# Patient Record
Sex: MALE | Race: WHITE | NOT HISPANIC OR LATINO | URBAN - METROPOLITAN AREA
[De-identification: names, ages, dates, MRNs, and addresses within clinical notes are randomized per-mention and may not be internally consistent; named-entity substitution may affect disease eponyms.]

---

## 2017-03-12 ENCOUNTER — EMERGENCY (EMERGENCY)
Facility: HOSPITAL | Age: 57
LOS: 0 days | Discharge: LEFT BEFORE DISCHARGE | End: 2017-03-13

## 2017-06-27 DIAGNOSIS — J02.9 ACUTE PHARYNGITIS, UNSPECIFIED: ICD-10-CM

## 2017-06-27 DIAGNOSIS — M54.2 CERVICALGIA: ICD-10-CM

## 2017-06-27 DIAGNOSIS — I10 ESSENTIAL (PRIMARY) HYPERTENSION: ICD-10-CM

## 2025-02-19 ENCOUNTER — OUTPATIENT (OUTPATIENT)
Dept: OUTPATIENT SERVICES | Facility: HOSPITAL | Age: 65
LOS: 1 days | End: 2025-02-19
Payer: COMMERCIAL

## 2025-02-19 VITALS
RESPIRATION RATE: 16 BRPM | DIASTOLIC BLOOD PRESSURE: 95 MMHG | OXYGEN SATURATION: 96 % | TEMPERATURE: 98 F | WEIGHT: 206.13 LBS | HEIGHT: 66 IN | HEART RATE: 58 BPM | SYSTOLIC BLOOD PRESSURE: 154 MMHG

## 2025-02-19 DIAGNOSIS — M17.0 BILATERAL PRIMARY OSTEOARTHRITIS OF KNEE: ICD-10-CM

## 2025-02-19 PROCEDURE — 73564 X-RAY EXAM KNEE 4 OR MORE: CPT | Mod: 26,50

## 2025-02-19 PROCEDURE — 73564 X-RAY EXAM KNEE 4 OR MORE: CPT | Mod: 50

## 2025-02-19 RX ORDER — ROSUVASTATIN CALCIUM 20 MG/1
1 TABLET, FILM COATED ORAL
Refills: 0 | DISCHARGE

## 2025-02-19 RX ORDER — AMLODIPINE BESYLATE 10 MG/1
1 TABLET ORAL
Refills: 0 | DISCHARGE

## 2025-02-19 RX ORDER — POVIDONE-IODINE 7.5 %
1 SOLUTION, NON-ORAL TOPICAL ONCE
Refills: 0 | Status: DISCONTINUED | OUTPATIENT
Start: 2025-02-20 | End: 2025-02-21

## 2025-02-19 RX ORDER — CLOPIDOGREL BISULFATE 75 MG/1
1 TABLET, FILM COATED ORAL
Refills: 0 | DISCHARGE

## 2025-02-19 NOTE — ASU PATIENT PROFILE, ADULT - TOBACCO USE
Never smoker Drysol Counseling:  I discussed with the patient the risks of drysol/aluminum chloride including but not limited to skin rash, itching, irritation, burning.

## 2025-02-19 NOTE — H&P ADULT - PROBLEM SELECTOR PLAN 1
Admit to Orthopaedic Service.  Presents today for elective Right TKR  Pt medically stable and cleared for procedure today by  ___ Admit to Orthopaedic Service.  Presents today for elective Right TKR  Pt medically stable and cleared for procedure today by Dr. Robert Admit to Orthopaedic Service.  Presents today for elective left TKR  Pt medically stable and cleared for procedure today by Dr. Robert

## 2025-02-19 NOTE — H&P ADULT - HISTORY OF PRESENT ILLNESS
64yo male with right knee pain x    Has the patient used any narcotic more than 90 days prior to the date of surgery? YES or NO    Presents today for elective Right total knee replacement with Dr. Weaver 64yo male with left knee pain x years without accident or injury to bring on pain. Pain persists despite conservative measures including PT and injections.   Pt takes tylenol at home for pain. Pt denies numbness, tingling, paresthesais to BLE. Ambulates with no assistance at baseline.   Has the patient used any narcotic more than 90 days prior to the date of surgery? No   Pt takes Plavix for hx of stroke in 2020, but has held Plavix since last dose Sunday evening  Presents today for elective left total knee replacement with Dr. Weaver

## 2025-02-19 NOTE — H&P ADULT - NSHPPHYSICALEXAM_GEN_ALL_CORE
Gen: Alert and oriented, NAD  MSK: Decreased ROM to right knee 2/2 pain     Remainder of PE per medical clearance note Gen: Alert and oriented, NAD  MSK: Decreased ROM to left knee 2/2 pain   MSK: No deformity or open wounds. No rashes or lesions. EHL/TA/GS/FHL 5/5 BLE. Sensation intact and equal BLE. Skin warm and well perfused. DP palpable BLE. Cap refill brisk. Calves soft, nontender BLE.   Remainder of PE per medical clearance note

## 2025-02-19 NOTE — PRE-OP CHECKLIST - 3.
Booking shet said RIGHT knee but the patient said LEFT knee, reached out with Dr Giraldo and as per office it should  be LEFT and they will change booking to correct

## 2025-02-19 NOTE — H&P ADULT - NSHPLABSRESULTS_GEN_ALL_CORE
Preop CBC, within normal limits- reviewed by medical clearance.  H/H: 16.7/49.5  Preop EKG: NSR, 91bpm, reviewed by medical clearance.   DOS

## 2025-02-20 ENCOUNTER — INPATIENT (INPATIENT)
Facility: HOSPITAL | Age: 65
LOS: 0 days | Discharge: HOME HEALTH | DRG: 470 | End: 2025-02-21
Attending: ORTHOPAEDIC SURGERY | Admitting: ORTHOPAEDIC SURGERY
Payer: COMMERCIAL

## 2025-02-20 DIAGNOSIS — M17.0 BILATERAL PRIMARY OSTEOARTHRITIS OF KNEE: ICD-10-CM

## 2025-02-20 DIAGNOSIS — M17.11 UNILATERAL PRIMARY OSTEOARTHRITIS, RIGHT KNEE: ICD-10-CM

## 2025-02-20 LAB
ANION GAP SERPL CALC-SCNC: 10 MMOL/L — SIGNIFICANT CHANGE UP (ref 5–17)
APTT BLD: 33.8 SEC — SIGNIFICANT CHANGE UP (ref 24.5–35.6)
BUN SERPL-MCNC: 16 MG/DL — SIGNIFICANT CHANGE UP (ref 7–23)
CALCIUM SERPL-MCNC: 9.3 MG/DL — SIGNIFICANT CHANGE UP (ref 8.4–10.5)
CHLORIDE SERPL-SCNC: 102 MMOL/L — SIGNIFICANT CHANGE UP (ref 96–108)
CO2 SERPL-SCNC: 25 MMOL/L — SIGNIFICANT CHANGE UP (ref 22–31)
CREAT SERPL-MCNC: 1.07 MG/DL — SIGNIFICANT CHANGE UP (ref 0.5–1.3)
EGFR: 78 ML/MIN/1.73M2 — SIGNIFICANT CHANGE UP
GLUCOSE SERPL-MCNC: 101 MG/DL — HIGH (ref 70–99)
INR BLD: 1.12 — SIGNIFICANT CHANGE UP (ref 0.85–1.16)
POTASSIUM SERPL-MCNC: 4.2 MMOL/L — SIGNIFICANT CHANGE UP (ref 3.5–5.3)
POTASSIUM SERPL-SCNC: 4.2 MMOL/L — SIGNIFICANT CHANGE UP (ref 3.5–5.3)
PROTHROM AB SERPL-ACNC: 13.1 SEC — SIGNIFICANT CHANGE UP (ref 9.9–13.4)
SODIUM SERPL-SCNC: 137 MMOL/L — SIGNIFICANT CHANGE UP (ref 135–145)

## 2025-02-20 PROCEDURE — 73560 X-RAY EXAM OF KNEE 1 OR 2: CPT | Mod: 26,LT

## 2025-02-20 DEVICE — CELLERATE SURGICAL POWDER RX 5GM: Type: IMPLANTABLE DEVICE | Site: RIGHT | Status: FUNCTIONAL

## 2025-02-20 DEVICE — ZIMMER FEMALE HEX SCREW MAGNETIC 2.5MM X 25MM: Type: IMPLANTABLE DEVICE | Site: RIGHT | Status: FUNCTIONAL

## 2025-02-20 DEVICE — ZIMMER/NEXGEN HEX HEAD SCREW 3.5MM: Type: IMPLANTABLE DEVICE | Site: RIGHT | Status: FUNCTIONAL

## 2025-02-20 DEVICE — IMPLANTABLE DEVICE: Type: IMPLANTABLE DEVICE | Site: RIGHT | Status: FUNCTIONAL

## 2025-02-20 DEVICE — ZIMMER/NEXGEN SMOOTH PIN 3.2X75MM: Type: IMPLANTABLE DEVICE | Site: RIGHT | Status: FUNCTIONAL

## 2025-02-20 DEVICE — SURF ART PERSONA LT 6-9EF 12MM: Type: IMPLANTABLE DEVICE | Site: RIGHT | Status: FUNCTIONAL

## 2025-02-20 RX ORDER — METOCLOPRAMIDE HCL 10 MG
5 TABLET ORAL ONCE
Refills: 0 | Status: COMPLETED | OUTPATIENT
Start: 2025-02-20 | End: 2025-02-20

## 2025-02-20 RX ORDER — HYDROXYUREA 500 MG/1
500 CAPSULE ORAL AT BEDTIME
Refills: 0 | Status: DISCONTINUED | OUTPATIENT
Start: 2025-02-20 | End: 2025-02-21

## 2025-02-20 RX ORDER — MAGNESIUM HYDROXIDE 400 MG/5ML
30 SUSPENSION ORAL DAILY
Refills: 0 | Status: DISCONTINUED | OUTPATIENT
Start: 2025-02-20 | End: 2025-02-21

## 2025-02-20 RX ORDER — METOCLOPRAMIDE HCL 10 MG
10 TABLET ORAL ONCE
Refills: 0 | Status: DISCONTINUED | OUTPATIENT
Start: 2025-02-20 | End: 2025-02-20

## 2025-02-20 RX ORDER — ONDANSETRON HCL/PF 4 MG/2 ML
4 VIAL (ML) INJECTION EVERY 6 HOURS
Refills: 0 | Status: DISCONTINUED | OUTPATIENT
Start: 2025-02-20 | End: 2025-02-21

## 2025-02-20 RX ORDER — BISACODYL 5 MG
10 TABLET, DELAYED RELEASE (ENTERIC COATED) ORAL ONCE
Refills: 0 | Status: DISCONTINUED | OUTPATIENT
Start: 2025-02-22 | End: 2025-02-21

## 2025-02-20 RX ORDER — METOPROLOL SUCCINATE 50 MG/1
1 TABLET, EXTENDED RELEASE ORAL
Refills: 0 | DISCHARGE

## 2025-02-20 RX ORDER — METOPROLOL SUCCINATE 50 MG/1
25 TABLET, EXTENDED RELEASE ORAL AT BEDTIME
Refills: 0 | Status: DISCONTINUED | OUTPATIENT
Start: 2025-02-20 | End: 2025-02-21

## 2025-02-20 RX ORDER — ASPIRIN 325 MG
81 TABLET ORAL
Refills: 0 | Status: DISCONTINUED | OUTPATIENT
Start: 2025-02-20 | End: 2025-02-21

## 2025-02-20 RX ORDER — HYDROMORPHONE/SOD CHLOR,ISO/PF 2 MG/10 ML
0.5 SYRINGE (ML) INJECTION
Refills: 0 | Status: COMPLETED | OUTPATIENT
Start: 2025-02-20 | End: 2025-02-27

## 2025-02-20 RX ORDER — ROSUVASTATIN CALCIUM 20 MG/1
1 TABLET, FILM COATED ORAL
Refills: 0 | DISCHARGE

## 2025-02-20 RX ORDER — CELECOXIB 50 MG/1
200 CAPSULE ORAL EVERY 12 HOURS
Refills: 0 | Status: DISCONTINUED | OUTPATIENT
Start: 2025-02-21 | End: 2025-02-21

## 2025-02-20 RX ORDER — OXYCODONE HYDROCHLORIDE 30 MG/1
10 TABLET ORAL EVERY 4 HOURS
Refills: 0 | Status: DISCONTINUED | OUTPATIENT
Start: 2025-02-20 | End: 2025-02-21

## 2025-02-20 RX ORDER — POLYETHYLENE GLYCOL 3350 17 G/17G
17 POWDER, FOR SOLUTION ORAL AT BEDTIME
Refills: 0 | Status: DISCONTINUED | OUTPATIENT
Start: 2025-02-20 | End: 2025-02-21

## 2025-02-20 RX ORDER — SENNA 187 MG
2 TABLET ORAL AT BEDTIME
Refills: 0 | Status: DISCONTINUED | OUTPATIENT
Start: 2025-02-20 | End: 2025-02-21

## 2025-02-20 RX ORDER — APREPITANT 40 MG/1
40 CAPSULE ORAL ONCE
Refills: 0 | Status: COMPLETED | OUTPATIENT
Start: 2025-02-20 | End: 2025-02-20

## 2025-02-20 RX ORDER — CEFAZOLIN SODIUM IN 0.9 % NACL 3 G/100 ML
2000 INTRAVENOUS SOLUTION, PIGGYBACK (ML) INTRAVENOUS EVERY 8 HOURS
Refills: 0 | Status: COMPLETED | OUTPATIENT
Start: 2025-02-20 | End: 2025-02-21

## 2025-02-20 RX ORDER — ROSUVASTATIN CALCIUM 20 MG/1
10 TABLET, FILM COATED ORAL AT BEDTIME
Refills: 0 | Status: DISCONTINUED | OUTPATIENT
Start: 2025-02-20 | End: 2025-02-21

## 2025-02-20 RX ORDER — MAGNESIUM, ALUMINUM HYDROXIDE 200-200 MG
30 TABLET,CHEWABLE ORAL
Refills: 0 | Status: DISCONTINUED | OUTPATIENT
Start: 2025-02-20 | End: 2025-02-21

## 2025-02-20 RX ORDER — AMLODIPINE BESYLATE 10 MG/1
5 TABLET ORAL DAILY
Refills: 0 | Status: DISCONTINUED | OUTPATIENT
Start: 2025-02-20 | End: 2025-02-21

## 2025-02-20 RX ORDER — ACETAMINOPHEN 500 MG/5ML
1000 LIQUID (ML) ORAL EVERY 8 HOURS
Refills: 0 | Status: DISCONTINUED | OUTPATIENT
Start: 2025-02-20 | End: 2025-02-21

## 2025-02-20 RX ORDER — KETOROLAC TROMETHAMINE 30 MG/ML
15 INJECTION, SOLUTION INTRAMUSCULAR; INTRAVENOUS EVERY 6 HOURS
Refills: 0 | Status: COMPLETED | OUTPATIENT
Start: 2025-02-20 | End: 2025-02-21

## 2025-02-20 RX ORDER — ACETAMINOPHEN 500 MG/5ML
1000 LIQUID (ML) ORAL ONCE
Refills: 0 | Status: COMPLETED | OUTPATIENT
Start: 2025-02-20 | End: 2025-02-20

## 2025-02-20 RX ORDER — HYDROMORPHONE/SOD CHLOR,ISO/PF 2 MG/10 ML
0.5 SYRINGE (ML) INJECTION
Refills: 0 | Status: DISCONTINUED | OUTPATIENT
Start: 2025-02-20 | End: 2025-02-21

## 2025-02-20 RX ORDER — OLMESARTAN MEDOXOMIL 5 MG/1
1 TABLET, FILM COATED ORAL
Refills: 0 | DISCHARGE

## 2025-02-20 RX ORDER — CELECOXIB 50 MG/1
200 CAPSULE ORAL ONCE
Refills: 0 | Status: COMPLETED | OUTPATIENT
Start: 2025-02-20 | End: 2025-02-20

## 2025-02-20 RX ORDER — HYDROXYUREA 500 MG/1
15 CAPSULE ORAL
Refills: 0 | DISCHARGE

## 2025-02-20 RX ORDER — SODIUM CHLORIDE 9 G/1000ML
1000 INJECTION, SOLUTION INTRAVENOUS
Refills: 0 | Status: DISCONTINUED | OUTPATIENT
Start: 2025-02-20 | End: 2025-02-21

## 2025-02-20 RX ORDER — OXYCODONE HYDROCHLORIDE 30 MG/1
5 TABLET ORAL EVERY 4 HOURS
Refills: 0 | Status: DISCONTINUED | OUTPATIENT
Start: 2025-02-20 | End: 2025-02-21

## 2025-02-20 RX ADMIN — Medication 0.5 MILLIGRAM(S): at 09:43

## 2025-02-20 RX ADMIN — Medication 0.5 MILLIGRAM(S): at 19:45

## 2025-02-20 RX ADMIN — CELECOXIB 200 MILLIGRAM(S): 50 CAPSULE ORAL at 13:54

## 2025-02-20 RX ADMIN — Medication 0.5 MILLIGRAM(S): at 19:19

## 2025-02-20 RX ADMIN — HYDROXYUREA 500 MILLIGRAM(S): 500 CAPSULE ORAL at 22:21

## 2025-02-20 RX ADMIN — Medication 4 MILLIGRAM(S): at 19:37

## 2025-02-20 RX ADMIN — Medication 5 MILLIGRAM(S): at 21:20

## 2025-02-20 RX ADMIN — ROSUVASTATIN CALCIUM 10 MILLIGRAM(S): 20 TABLET, FILM COATED ORAL at 22:21

## 2025-02-20 RX ADMIN — METOPROLOL SUCCINATE 25 MILLIGRAM(S): 50 TABLET, EXTENDED RELEASE ORAL at 22:21

## 2025-02-20 RX ADMIN — Medication 0.5 MILLIGRAM(S): at 19:03

## 2025-02-20 RX ADMIN — KETOROLAC TROMETHAMINE 15 MILLIGRAM(S): 30 INJECTION, SOLUTION INTRAMUSCULAR; INTRAVENOUS at 23:45

## 2025-02-20 RX ADMIN — APREPITANT 40 MILLIGRAM(S): 40 CAPSULE ORAL at 13:54

## 2025-02-20 RX ADMIN — KETOROLAC TROMETHAMINE 15 MILLIGRAM(S): 30 INJECTION, SOLUTION INTRAMUSCULAR; INTRAVENOUS at 23:44

## 2025-02-20 RX ADMIN — Medication 1000 MILLIGRAM(S): at 13:54

## 2025-02-20 NOTE — PRE-ANESTHESIA EVALUATION ADULT - NSANTHADDINFOFT_GEN_ALL_CORE
Medical evaluation reviewed.  NOT A CANDIDATE FOR SPINAL ANESTHESIA DUE TO LAST DOSE OF PLAVIX TAKEN LESS THAN 5 DAYS AGO.

## 2025-02-20 NOTE — PRE-ANESTHESIA EVALUATION ADULT - NSANTHPROCED_GEN_ALL_CORE
right adductor canal and ipack block/Regional Block left adductor canal and ipack block/Regional Block

## 2025-02-20 NOTE — PRE-ANESTHESIA EVALUATION ADULT - NSANTHOSAYNRD_GEN_A_CORE
No. SHINE screening performed.  STOP BANG Legend: 0-2 = LOW Risk; 3-4 = INTERMEDIATE Risk; 5-8 = HIGH Risk

## 2025-02-20 NOTE — BRIEF OPERATIVE NOTE - CONDITION POST OP
Problem: Pressure Injury, Risk for  Goal: # Skin remains intact  Outcome: Outcome Met, Continue evaluating goal progress toward completion     Problem: Stroke: Ischemic (Transient/Permanent)  Goal: Neurological status is maintained/restored to status at baseline  Description: Monitor neurological and mental status including symptoms of increasing intracranial pressure (headache, nausea/vomiting, or change in behavior). Hypertension (greater than 180 systolic) may also indicate a change in status related to stroke.  Outcome: Outcome Not Met, Continue to Monitor     Problem: At Risk for Falls  Goal: # Takes action to control fall-related risks  Outcome: Outcome Not Met, Continue to Monitor     Problem: Activity Intolerance  Goal: # Functional status is maintained or returned to baseline  Outcome: Outcome Not Met, Continue to Monitor     Problem: Impaired Physical Mobility  Goal: # Bed mobility, ambulation, and ADLs are maintained or returned to baseline during hospitalization  Outcome: Outcome Not Met, Continue to Monitor     Problem: VTE, Risk for  Goal: # Verbalizes understanding of VTE risk factors and prevention  Description: Document education using the patient education activity.   Outcome: Outcome Not Met, Continue to Monitor      Stable

## 2025-02-21 VITALS
HEART RATE: 60 BPM | RESPIRATION RATE: 17 BRPM | SYSTOLIC BLOOD PRESSURE: 115 MMHG | OXYGEN SATURATION: 94 % | DIASTOLIC BLOOD PRESSURE: 68 MMHG

## 2025-02-21 LAB
ANION GAP SERPL CALC-SCNC: 10 MMOL/L — SIGNIFICANT CHANGE UP (ref 5–17)
BUN SERPL-MCNC: 22 MG/DL — SIGNIFICANT CHANGE UP (ref 7–23)
CALCIUM SERPL-MCNC: 8.2 MG/DL — LOW (ref 8.4–10.5)
CHLORIDE SERPL-SCNC: 102 MMOL/L — SIGNIFICANT CHANGE UP (ref 96–108)
CO2 SERPL-SCNC: 24 MMOL/L — SIGNIFICANT CHANGE UP (ref 22–31)
CREAT SERPL-MCNC: 1.19 MG/DL — SIGNIFICANT CHANGE UP (ref 0.5–1.3)
EGFR: 69 ML/MIN/1.73M2 — SIGNIFICANT CHANGE UP
GLUCOSE SERPL-MCNC: 152 MG/DL — HIGH (ref 70–99)
HCT VFR BLD CALC: 43.8 % — SIGNIFICANT CHANGE UP (ref 39–50)
HGB BLD-MCNC: 14.8 G/DL — SIGNIFICANT CHANGE UP (ref 13–17)
MCHC RBC-ENTMCNC: 32 PG — SIGNIFICANT CHANGE UP (ref 27–34)
MCHC RBC-ENTMCNC: 33.8 G/DL — SIGNIFICANT CHANGE UP (ref 32–36)
MCV RBC AUTO: 94.6 FL — SIGNIFICANT CHANGE UP (ref 80–100)
NRBC BLD AUTO-RTO: 0 /100 WBCS — SIGNIFICANT CHANGE UP (ref 0–0)
PLATELET # BLD AUTO: 492 K/UL — HIGH (ref 150–400)
POTASSIUM SERPL-MCNC: 4.2 MMOL/L — SIGNIFICANT CHANGE UP (ref 3.5–5.3)
POTASSIUM SERPL-SCNC: 4.2 MMOL/L — SIGNIFICANT CHANGE UP (ref 3.5–5.3)
RBC # BLD: 4.63 M/UL — SIGNIFICANT CHANGE UP (ref 4.2–5.8)
RBC # FLD: 14.1 % — SIGNIFICANT CHANGE UP (ref 10.3–14.5)
SODIUM SERPL-SCNC: 136 MMOL/L — SIGNIFICANT CHANGE UP (ref 135–145)
WBC # BLD: 19.45 K/UL — HIGH (ref 3.8–10.5)
WBC # FLD AUTO: 19.45 K/UL — HIGH (ref 3.8–10.5)

## 2025-02-21 PROCEDURE — 73560 X-RAY EXAM OF KNEE 1 OR 2: CPT

## 2025-02-21 PROCEDURE — 97161 PT EVAL LOW COMPLEX 20 MIN: CPT

## 2025-02-21 PROCEDURE — 85610 PROTHROMBIN TIME: CPT

## 2025-02-21 PROCEDURE — 85730 THROMBOPLASTIN TIME PARTIAL: CPT

## 2025-02-21 PROCEDURE — 80048 BASIC METABOLIC PNL TOTAL CA: CPT

## 2025-02-21 PROCEDURE — C1889: CPT

## 2025-02-21 PROCEDURE — 85027 COMPLETE CBC AUTOMATED: CPT

## 2025-02-21 PROCEDURE — C1776: CPT

## 2025-02-21 PROCEDURE — C1713: CPT

## 2025-02-21 PROCEDURE — 36415 COLL VENOUS BLD VENIPUNCTURE: CPT

## 2025-02-21 RX ORDER — CELECOXIB 50 MG/1
1 CAPSULE ORAL
Qty: 14 | Refills: 0
Start: 2025-02-21 | End: 2025-02-27

## 2025-02-21 RX ORDER — AMLODIPINE BESYLATE 10 MG/1
1 TABLET ORAL
Refills: 0 | DISCHARGE

## 2025-02-21 RX ORDER — SENNA 187 MG
2 TABLET ORAL
Qty: 0 | Refills: 0 | DISCHARGE
Start: 2025-02-21

## 2025-02-21 RX ORDER — ASPIRIN 325 MG
1 TABLET ORAL
Qty: 60 | Refills: 0
Start: 2025-02-21 | End: 2025-03-22

## 2025-02-21 RX ORDER — AMLODIPINE BESYLATE 10 MG/1
1 TABLET ORAL
Qty: 0 | Refills: 0 | DISCHARGE
Start: 2025-02-21

## 2025-02-21 RX ORDER — HYDROMORPHONE/SOD CHLOR,ISO/PF 2 MG/10 ML
0.5 SYRINGE (ML) INJECTION EVERY 6 HOURS
Refills: 0 | Status: DISCONTINUED | OUTPATIENT
Start: 2025-02-21 | End: 2025-02-21

## 2025-02-21 RX ORDER — OXYCODONE HYDROCHLORIDE 30 MG/1
1 TABLET ORAL
Qty: 28 | Refills: 0
Start: 2025-02-21 | End: 2025-02-27

## 2025-02-21 RX ORDER — ACETAMINOPHEN 500 MG/5ML
2 LIQUID (ML) ORAL
Qty: 42 | Refills: 0
Start: 2025-02-21 | End: 2025-02-27

## 2025-02-21 RX ADMIN — Medication 40 MILLIGRAM(S): at 06:02

## 2025-02-21 RX ADMIN — KETOROLAC TROMETHAMINE 15 MILLIGRAM(S): 30 INJECTION, SOLUTION INTRAMUSCULAR; INTRAVENOUS at 07:28

## 2025-02-21 RX ADMIN — KETOROLAC TROMETHAMINE 15 MILLIGRAM(S): 30 INJECTION, SOLUTION INTRAMUSCULAR; INTRAVENOUS at 12:23

## 2025-02-21 RX ADMIN — Medication 0.5 MILLIGRAM(S): at 02:15

## 2025-02-21 RX ADMIN — Medication 0.5 MILLIGRAM(S): at 02:17

## 2025-02-21 RX ADMIN — KETOROLAC TROMETHAMINE 15 MILLIGRAM(S): 30 INJECTION, SOLUTION INTRAMUSCULAR; INTRAVENOUS at 12:35

## 2025-02-21 RX ADMIN — CELECOXIB 200 MILLIGRAM(S): 50 CAPSULE ORAL at 07:28

## 2025-02-21 RX ADMIN — Medication 100 MILLIGRAM(S): at 08:51

## 2025-02-21 RX ADMIN — Medication 81 MILLIGRAM(S): at 05:58

## 2025-02-21 RX ADMIN — KETOROLAC TROMETHAMINE 15 MILLIGRAM(S): 30 INJECTION, SOLUTION INTRAMUSCULAR; INTRAVENOUS at 05:58

## 2025-02-21 RX ADMIN — Medication 1000 MILLIGRAM(S): at 08:50

## 2025-02-21 RX ADMIN — Medication 100 MILLIGRAM(S): at 00:23

## 2025-02-21 RX ADMIN — CELECOXIB 200 MILLIGRAM(S): 50 CAPSULE ORAL at 05:58

## 2025-02-21 RX ADMIN — Medication 1000 MILLIGRAM(S): at 09:43

## 2025-02-21 NOTE — PROGRESS NOTE ADULT - SUBJECTIVE AND OBJECTIVE BOX
Ortho Post Op Check    Procedure: L TKA  Surgeon: Dr. Weaver    Pt comfortable without complaints, pain controlled  Denies CP, SOB, N/V, numbness/tingling     Vital Signs Last 24 Hrs  T(C): 36.6 (02-20-25 @ 18:39), Max: 36.6 (02-20-25 @ 18:39)  T(F): 97.8 (02-20-25 @ 18:39), Max: 97.8 (02-20-25 @ 18:39)  HR: 72 (02-20-25 @ 19:39) (66 - 86)  BP: 111/61 (02-20-25 @ 19:39) (111/61 - 133/75)  BP(mean): 81 (02-20-25 @ 19:39) (81 - 98)  RR: 17 (02-20-25 @ 19:39) (12 - 17)  SpO2: 96% (02-20-25 @ 19:39) (90% - 96%)  I&O's Summary      General: Pt Alert and oriented, NAD  DSG C/D/I - aquacel, Ace wrap, cryocuff  Pulses:2+ DP  Sensation: SILT  Motor: EHL/FHL/TA/GS 5/5 02-20    137  |  102  |  16  ----------------------------<  101[H]  4.2   |  25  |  1.07    Ca    9.3      20 Feb 2025 13:24        Post-op X-Ray: Hardware intact    A/P: 65yMale POD#0 s/p L TKA  - Stable  - Pain Control  - DVT ppx: ASA 81 bid  - Post op abx: Ancef  - PT, WBS: WBAT  Dispo: Pending PT eval    Ortho Pager [phone]
Ortho Note    Pt comfortable without complaints, pain controlled  Denies CP, SOB, N/V, numbness/tingling     Vital Signs Last 24 Hrs  T(C): --  T(F): --  HR: 82 (02-21-25 @ 04:00) (82 - 82)  BP: 105/55 (02-21-25 @ 04:00) (105/55 - 105/55)  BP(mean): 73 (02-21-25 @ 04:00) (73 - 73)  RR: 11 (02-21-25 @ 04:00) (11 - 11)  SpO2: 95% (02-21-25 @ 04:00) (95% - 95%)  I&O's Summary    20 Feb 2025 07:01  -  21 Feb 2025 07:00  --------------------------------------------------------  IN: 630 mL / OUT: 600 mL / NET: 30 mL        General: Pt Alert and oriented, NAD  DSG C/D/I  Pulses: 2+  Sensation: SILT  Motor: 5/5 EHL/FHL/TA/GS                          14.8   19.45 )-----------( 492      ( 21 Feb 2025 05:30 )             43.8     02-21    136  |  102  |  22  ----------------------------<  152[H]  4.2   |  24  |  1.19    Ca    8.2[L]      21 Feb 2025 05:30        A/P: 65yMale POD#1 s/p L TKA  - Stable  - Pain Control  - DVT ppx: SCDs, ASA  - PT, WBS: WBAT  - Pending PT eval today  - [facility]    Ortho Pager [phone]
Ortho Note    Subjective:  Pt comfortable without complaints, pain controlled with current pain medication regimen   Denies CP, SOB, N/V, numbness/tingling   Reivewed plan of care with patient at bedside    Vital Signs Last 24 Hrs  T(C): 36.6 (02-21-25 @ 08:00), Max: 36.6 (02-21-25 @ 08:00)  T(F): 97.8 (02-21-25 @ 08:00), Max: 97.8 (02-21-25 @ 08:00)  HR: 58 (02-21-25 @ 10:00) (56 - 75)  BP: 115/67 (02-21-25 @ 10:00) (97/56 - 115/67)  BP(mean): 82 (02-21-25 @ 10:00) (68 - 82)  RR: 20 (02-21-25 @ 10:00) (20 - 20)  SpO2: 94% (02-21-25 @ 10:00) (92% - 95%)  AVSS    Objective:    Physical Exam:  General: Pt Alert and oriented, NAD  Left Knee DSG C/D/I aquacel wrapped with curlex  Pulses: +2 pedal pulses, wwp toes  Sensation: silt intact bilateral LE  Motor: EHL/FHL/TA/GS- 5/5 bilateral LE        Plan of Care:  A/P: 65yMale POD#1 s/p Left TKA  - afebrile, wbcs 19.45- likely reactive due to surgery   - Pain Control- celebrex 200mg PO Q12h, Dilaudid 0.5mg Q6h prn breakthrough pain, tylenol 1000mg PO Q8h, toradol 15mg IV Q6h, x43 doses, Oxycodone 5-10mg Po Q4h prn moderate to severe pain   - DVT ppx: aspirin 81mg PO Daily, plavix 75mg Po daily   - PT, WBS: WBAT  - appreciate medicine recs  - bowel regimen, IS use, PPI   - cleared PT 2-  - Dispo-  dc to home today     Ortho Pager [phone]

## 2025-02-21 NOTE — ASU DISCHARGE PLAN (ADULT/PEDIATRIC) - ASU DC SPECIAL INSTRUCTIONSFT
ACTIVITY:   - Weight bear as tolerated with assistive device. No strenuous activity, heavy lifting, driving or returning to work until cleared by MD.   - Apply a cold compress to the surgical site several times daily to reduce pain and swelling. For icing, twenty-minute sessions followed by an hour off is recommended. You should ice as frequently as possible. Ice should NEVER be placed directly on the skin. Wearing compression stockings during the first week after surgery can help reduce swelling in your knee, calf and foot, but is not required.      (KNEE REPLACEMENTS)   DO place a pillow under your heel when elevating the leg, to encourage full extension of the knee. Do NOT place a pillow behind your knee for comfort, as this can lead to permanent difficulty straightening your leg. It is normal to develop some swelling in the leg, ankle, and foot because of gravity.       DRESSING/SHOWERING:   (AQUACEL – brown gel dressing)   - You may shower, your dressing is water-resistant. Do not soak in bathtubs. Remove dressing after postop day 7, then leave incision open to air. Keep your incision clean and dry. Do not pick at your incision. Do not apply creams, ointments or oils to your incision until cleared by your surgeon. Do not soak your incision in sitting water (ie tubs, pools, lakes, etc.) until cleared by your surgeon. Do not scrub the incision – instead, allow soap and water to flow over the incision and then pat it dry with a clean towel.         MEDICATION/ANTICOAGULATION:   -You have been prescribed aspirin, also continue your home plavix , as a preventative to help prevent postoperative blood clots. Please take this medication as prescribed.    - You have been prescribed medications for pain:     - Tylenol for mild to moderate pain. Do not exceed 3,000mg daily.     - For more severe pain, take Tylenol with the addition of narcotic pain medication. Take this medication as prescribed. This medication may cause drowsiness or dizziness. Do not operate machinery. This medication may cause constipation.   - For any additional medications, follow instructions on the bottle.    -Try to have regular bowel movements. Take stool softener or laxative if necessary. You may wish to take Miralax daily until you have regular bowel movements.    - If you have been prescribed Aspirin or an anti-inflammatory, please take prilosec (omeprazole) once a day, before breakfast, until no longer taking Aspirin or anti-inflammatory. This will help protect your stomach.   - If you have a pain management physician, please follow-up with them postoperatively.    - If you experience any negative side effects of your medications, please call your surgeon's office to discuss.      FOLLOW-UP:   - Call to schedule an appt with Dr. Precious Montemayor for follow up.   - Please follow-up with your primary care physician or any other specialist you see postoperatively, if needed.    - Contact your doctor or go to the emergency room if you experience: fever greater than 101.5, chills, chest pain, difficulty breathing, redness or excessive drainage around the incision, other concerns.  Your meds were sent to [facility] pharmacy  restart your home medications

## 2025-02-21 NOTE — ASU DISCHARGE PLAN (ADULT/PEDIATRIC) - CARE PROVIDER_API CALL
Porfirio Giraldo  Orthopaedic Surgery  [address]  [place] [address]  Phone: [phone]  Fax: [phone]  Follow Up Time: 2 weeks

## 2025-02-21 NOTE — ASU DISCHARGE PLAN (ADULT/PEDIATRIC) - PAIN MANAGEMENT
[facility] pharmacy/Prescription called to pharmacy/Prescriptions electronically submitted to pharmacy from [facility]

## 2025-02-21 NOTE — ASU DISCHARGE PLAN (ADULT/PEDIATRIC) - NS MD DC FALL RISK RISK
For information on Fall & Injury Prevention, visit: [email or web address] OR  [email or web address] OR  https://www.cdc.gov/steadi/patient.html

## 2025-02-21 NOTE — DISCHARGE NOTE NURSING/CASE MANAGEMENT/SOCIAL WORK - PATIENT PORTAL LINK FT
You can access the FollowMyHealth Patient Portal offered by [facility] by registering at the following website: [email or web address]. By joining [facility]’s FollowMyHealth portal, you will also be able to view your health information using other applications (apps) compatible with our system.

## 2025-02-21 NOTE — PHYSICAL THERAPY INITIAL EVALUATION ADULT - PERTINENT HX OF CURRENT PROBLEM, REHAB EVAL
64yo male with left knee pain x years without accident or injury to bring on pain. Pain persists despite conservative measures including PT and injections.   Pt takes tylenol at home for pain. Pt denies numbness, tingling, paresthesais to BLE. Ambulates with no assistance at baseline.   Has the patient used any narcotic more than 90 days prior to the date of surgery? No   Pt takes Plavix for hx of stroke in 2020, but has held Plavix since last dose Sunday evening  Presents today for elective left total knee replacement with Dr. Weaver

## 2025-02-21 NOTE — DISCHARGE NOTE NURSING/CASE MANAGEMENT/SOCIAL WORK - FINANCIAL ASSISTANCE
[facility] provides services at a reduced cost to those who are determined to be eligible through [facility]’s financial assistance program. Information regarding [facility]’s financial assistance program can be found by going to [email or web address] or by calling [phone].

## 2025-02-21 NOTE — ASU DISCHARGE PLAN (ADULT/PEDIATRIC) - FINANCIAL ASSISTANCE
[facility] provides services at a reduced cost to those who are determined to be eligible through [facility]’s financial assistance program. Information regarding [facility]’s financial assistance program can be found by going to [email or web address] or by calling [phone].

## 2025-02-26 DIAGNOSIS — Z86.73 PERSONAL HISTORY OF TRANSIENT ISCHEMIC ATTACK (TIA), AND CEREBRAL INFARCTION WITHOUT RESIDUAL DEFICITS: ICD-10-CM

## 2025-02-26 DIAGNOSIS — M17.12 UNILATERAL PRIMARY OSTEOARTHRITIS, LEFT KNEE: ICD-10-CM

## 2025-02-26 DIAGNOSIS — Z79.02 LONG TERM (CURRENT) USE OF ANTITHROMBOTICS/ANTIPLATELETS: ICD-10-CM

## 2025-02-26 DIAGNOSIS — I10 ESSENTIAL (PRIMARY) HYPERTENSION: ICD-10-CM

## 2025-02-26 DIAGNOSIS — E78.5 HYPERLIPIDEMIA, UNSPECIFIED: ICD-10-CM

## 2025-02-26 DIAGNOSIS — M25.561 PAIN IN RIGHT KNEE: ICD-10-CM

## (undated) DEVICE — POSITIONER FOAM EGG CRATE ULNAR 2PCS (PINK)

## (undated) DEVICE — SAW BLADE STRYKER SAGITTAL DUAL CUT TEETH 35X64X.64MM

## (undated) DEVICE — PACK TOTAL KNEE

## (undated) DEVICE — WARMING BLANKET UPPER ADULT

## (undated) DEVICE — DRSG AQUACEL 3.5 X 10"

## (undated) DEVICE — TOURNIQUET CUFF 34" DUAL PORT W PLC

## (undated) DEVICE — SAW BLADE STRYKER SAGITTAL 81.5X12.5X1.19MM

## (undated) DEVICE — PREP CHLORAPREP HI-LITE ORANGE 26ML

## (undated) DEVICE — VENODYNE/SCD SLEEVE CALF MEDIUM

## (undated) DEVICE — SAW BLADE STRYKER SAGITTAL 25X86.5X1.32MM

## (undated) DEVICE — SUT STRATAFIX SYMMETRIC PDS 1 45CM OS-6

## (undated) DEVICE — SUT STRATAFIX SPIRAL MONOCRYL PLUS 3-0 30CM PS-1 UNDYED

## (undated) DEVICE — GLV 8.5 PROTEXIS (WHITE)

## (undated) DEVICE — SUT VICRYL 2-0 27" CT-1 UNDYED